# Patient Record
Sex: FEMALE | Race: WHITE | NOT HISPANIC OR LATINO | URBAN - METROPOLITAN AREA
[De-identification: names, ages, dates, MRNs, and addresses within clinical notes are randomized per-mention and may not be internally consistent; named-entity substitution may affect disease eponyms.]

---

## 2019-04-01 ENCOUNTER — OUTPATIENT (OUTPATIENT)
Dept: OUTPATIENT SERVICES | Facility: HOSPITAL | Age: 54
LOS: 1 days | Discharge: HOME | End: 2019-04-01
Payer: COMMERCIAL

## 2019-04-01 VITALS
RESPIRATION RATE: 16 BRPM | HEIGHT: 62 IN | SYSTOLIC BLOOD PRESSURE: 120 MMHG | WEIGHT: 113.32 LBS | TEMPERATURE: 98 F | HEART RATE: 51 BPM | DIASTOLIC BLOOD PRESSURE: 51 MMHG | OXYGEN SATURATION: 100 %

## 2019-04-01 DIAGNOSIS — Z90.13 ACQUIRED ABSENCE OF BILATERAL BREASTS AND NIPPLES: Chronic | ICD-10-CM

## 2019-04-01 DIAGNOSIS — Z12.11 ENCOUNTER FOR SCREENING FOR MALIGNANT NEOPLASM OF COLON: ICD-10-CM

## 2019-04-01 DIAGNOSIS — Z80.3 FAMILY HISTORY OF MALIGNANT NEOPLASM OF BREAST: ICD-10-CM

## 2019-04-01 DIAGNOSIS — Z85.3 PERSONAL HISTORY OF MALIGNANT NEOPLASM OF BREAST: ICD-10-CM

## 2019-04-01 DIAGNOSIS — Z90.13 ACQUIRED ABSENCE OF BILATERAL BREASTS AND NIPPLES: ICD-10-CM

## 2019-04-01 DIAGNOSIS — K63.5 POLYP OF COLON: ICD-10-CM

## 2019-04-01 DIAGNOSIS — Z87.891 PERSONAL HISTORY OF NICOTINE DEPENDENCE: ICD-10-CM

## 2019-04-01 LAB
ANION GAP SERPL CALC-SCNC: 3 MMOL/L — LOW (ref 5–17)
BASOPHILS # BLD AUTO: 0.04 K/UL — SIGNIFICANT CHANGE UP (ref 0–0.2)
BASOPHILS NFR BLD AUTO: 0.7 % — SIGNIFICANT CHANGE UP (ref 0–2)
BUN SERPL-MCNC: 17 MG/DL — SIGNIFICANT CHANGE UP (ref 7–23)
CALCIUM SERPL-MCNC: 9.2 MG/DL — SIGNIFICANT CHANGE UP (ref 8.5–10.1)
CHLORIDE SERPL-SCNC: 106 MMOL/L — SIGNIFICANT CHANGE UP (ref 96–108)
CO2 SERPL-SCNC: 31 MMOL/L — SIGNIFICANT CHANGE UP (ref 22–31)
CREAT SERPL-MCNC: 0.69 MG/DL — SIGNIFICANT CHANGE UP (ref 0.5–1.3)
EOSINOPHIL # BLD AUTO: 0.24 K/UL — SIGNIFICANT CHANGE UP (ref 0–0.5)
EOSINOPHIL NFR BLD AUTO: 4.4 % — SIGNIFICANT CHANGE UP (ref 0–6)
GLUCOSE SERPL-MCNC: 81 MG/DL — SIGNIFICANT CHANGE UP (ref 70–99)
HCT VFR BLD CALC: 39.6 % — SIGNIFICANT CHANGE UP (ref 34.5–45)
HGB BLD-MCNC: 13.2 G/DL — SIGNIFICANT CHANGE UP (ref 11.5–15.5)
IMM GRANULOCYTES NFR BLD AUTO: 0.2 % — SIGNIFICANT CHANGE UP (ref 0–1.5)
LYMPHOCYTES # BLD AUTO: 1.61 K/UL — SIGNIFICANT CHANGE UP (ref 1–3.3)
LYMPHOCYTES # BLD AUTO: 29.7 % — SIGNIFICANT CHANGE UP (ref 13–44)
MCHC RBC-ENTMCNC: 31.7 PG — SIGNIFICANT CHANGE UP (ref 27–34)
MCHC RBC-ENTMCNC: 33.3 GM/DL — SIGNIFICANT CHANGE UP (ref 32–36)
MCV RBC AUTO: 95.2 FL — SIGNIFICANT CHANGE UP (ref 80–100)
MONOCYTES # BLD AUTO: 0.48 K/UL — SIGNIFICANT CHANGE UP (ref 0–0.9)
MONOCYTES NFR BLD AUTO: 8.9 % — SIGNIFICANT CHANGE UP (ref 2–14)
NEUTROPHILS # BLD AUTO: 3.04 K/UL — SIGNIFICANT CHANGE UP (ref 1.8–7.4)
NEUTROPHILS NFR BLD AUTO: 56.1 % — SIGNIFICANT CHANGE UP (ref 43–77)
NRBC # BLD: 0 /100 WBCS — SIGNIFICANT CHANGE UP (ref 0–0)
PLATELET # BLD AUTO: 202 K/UL — SIGNIFICANT CHANGE UP (ref 150–400)
POTASSIUM SERPL-MCNC: 3.8 MMOL/L — SIGNIFICANT CHANGE UP (ref 3.5–5.3)
POTASSIUM SERPL-SCNC: 3.8 MMOL/L — SIGNIFICANT CHANGE UP (ref 3.5–5.3)
RBC # BLD: 4.16 M/UL — SIGNIFICANT CHANGE UP (ref 3.8–5.2)
RBC # FLD: 12.8 % — SIGNIFICANT CHANGE UP (ref 10.3–14.5)
SODIUM SERPL-SCNC: 140 MMOL/L — SIGNIFICANT CHANGE UP (ref 135–145)
WBC # BLD: 5.42 K/UL — SIGNIFICANT CHANGE UP (ref 3.8–10.5)
WBC # FLD AUTO: 5.42 K/UL — SIGNIFICANT CHANGE UP (ref 3.8–10.5)

## 2019-04-01 PROCEDURE — 93010 ELECTROCARDIOGRAM REPORT: CPT

## 2019-04-01 NOTE — H&P PST ADULT - HISTORY OF PRESENT ILLNESS
55 y/o female with pmhx of breast cancer s/p bilateral mastectomy presents to PST prior to colonoscopy. Reports abdominal bloating and excessive flatus. Now for proposed procedure.

## 2019-04-01 NOTE — H&P PST ADULT - ASSESSMENT
55 y/o female scheduled for colonoscopy on 04/09/19 with Dr Carmona      PLAN:  1. Labs per Dr Carmona  2. EKG  3. Discussed day of procedure instructions. Pt verbalized complete understanding of instructions.

## 2019-04-01 NOTE — H&P PST ADULT - NSANTHOSAYNRD_GEN_A_CORE
No. DALI screening performed.  STOP BANG Legend: 0-2 = LOW Risk; 3-4 = INTERMEDIATE Risk; 5-8 = HIGH Risk

## 2019-04-09 ENCOUNTER — RESULT REVIEW (OUTPATIENT)
Age: 54
End: 2019-04-09

## 2019-04-09 ENCOUNTER — OUTPATIENT (OUTPATIENT)
Dept: OUTPATIENT SERVICES | Facility: HOSPITAL | Age: 54
LOS: 1 days | Discharge: HOME | End: 2019-04-09
Payer: COMMERCIAL

## 2019-04-09 VITALS
HEIGHT: 64 IN | SYSTOLIC BLOOD PRESSURE: 131 MMHG | DIASTOLIC BLOOD PRESSURE: 72 MMHG | TEMPERATURE: 97 F | HEART RATE: 57 BPM | WEIGHT: 113.1 LBS | OXYGEN SATURATION: 100 % | RESPIRATION RATE: 15 BRPM

## 2019-04-09 DIAGNOSIS — Z90.13 ACQUIRED ABSENCE OF BILATERAL BREASTS AND NIPPLES: Chronic | ICD-10-CM

## 2019-04-09 PROCEDURE — 88305 TISSUE EXAM BY PATHOLOGIST: CPT | Mod: 26

## 2019-04-11 LAB — SURGICAL PATHOLOGY STUDY: SIGNIFICANT CHANGE UP

## 2019-04-12 DIAGNOSIS — Z01.818 ENCOUNTER FOR OTHER PREPROCEDURAL EXAMINATION: ICD-10-CM

## 2019-04-12 DIAGNOSIS — Z12.11 ENCOUNTER FOR SCREENING FOR MALIGNANT NEOPLASM OF COLON: ICD-10-CM

## 2019-04-14 DIAGNOSIS — Z90.13 ACQUIRED ABSENCE OF BILATERAL BREASTS AND NIPPLES: ICD-10-CM

## 2019-04-14 DIAGNOSIS — Z12.11 ENCOUNTER FOR SCREENING FOR MALIGNANT NEOPLASM OF COLON: ICD-10-CM

## 2019-04-14 DIAGNOSIS — Z87.891 PERSONAL HISTORY OF NICOTINE DEPENDENCE: ICD-10-CM

## 2019-04-14 DIAGNOSIS — Z80.3 FAMILY HISTORY OF MALIGNANT NEOPLASM OF BREAST: ICD-10-CM

## 2019-04-14 DIAGNOSIS — Z85.3 PERSONAL HISTORY OF MALIGNANT NEOPLASM OF BREAST: ICD-10-CM

## 2019-04-14 DIAGNOSIS — K63.5 POLYP OF COLON: ICD-10-CM

## 2019-05-13 ENCOUNTER — RESULT REVIEW (OUTPATIENT)
Age: 54
End: 2019-05-13

## 2021-01-04 NOTE — H&P PST ADULT - AS BP NONINV METHOD
Please fax today's OV note to [facility] to get wheelchair, and place order if they need one. See telephone enc 12/10/20    · Ynes returned call to clinic and informs me that she forgot [facility] is no longer in [place], would suggest using [facility] in [place].  According to [facility], patient's insurance would require a Face-to-Face and proper forms to be completed along with script.  Ok as requested?  If yes, [facility] can be reached at [phone] and fax [phone].   electronic

## 2021-05-12 NOTE — H&P PST ADULT - GENERAL
negative Burow's Advancement Flap Text: The defect edges were debeveled with a #15 scalpel blade.  Given the location of the defect and the proximity to free margins a Burow's advancement flap was deemed most appropriate.  Using a sterile surgical marker, the appropriate advancement flap was drawn incorporating the defect and placing the expected incisions within the relaxed skin tension lines where possible.    The area thus outlined was incised deep to adipose tissue with a #15 scalpel blade.  The skin margins were undermined to an appropriate distance in all directions utilizing iris scissors.

## 2021-06-01 ENCOUNTER — APPOINTMENT (OUTPATIENT)
Dept: OTOLARYNGOLOGY | Facility: CLINIC | Age: 56
End: 2021-06-01
Payer: COMMERCIAL

## 2021-06-01 VITALS
SYSTOLIC BLOOD PRESSURE: 113 MMHG | DIASTOLIC BLOOD PRESSURE: 77 MMHG | WEIGHT: 115 LBS | HEART RATE: 59 BPM | BODY MASS INDEX: 19.63 KG/M2 | TEMPERATURE: 96.8 F | HEIGHT: 64 IN

## 2021-06-01 DIAGNOSIS — J34.2 DEVIATED NASAL SEPTUM: ICD-10-CM

## 2021-06-01 DIAGNOSIS — J30.2 OTHER SEASONAL ALLERGIC RHINITIS: ICD-10-CM

## 2021-06-01 DIAGNOSIS — H81.11 BENIGN PAROXYSMAL VERTIGO, RIGHT EAR: ICD-10-CM

## 2021-06-01 DIAGNOSIS — H60.62 UNSPECIFIED CHRONIC OTITIS EXTERNA, LEFT EAR: ICD-10-CM

## 2021-06-01 DIAGNOSIS — R42 DIZZINESS AND GIDDINESS: ICD-10-CM

## 2021-06-01 DIAGNOSIS — H93.299 OTHER ABNORMAL AUDITORY PERCEPTIONS, UNSPECIFIED EAR: ICD-10-CM

## 2021-06-01 PROBLEM — Z00.00 ENCOUNTER FOR PREVENTIVE HEALTH EXAMINATION: Status: ACTIVE | Noted: 2021-06-01

## 2021-06-01 PROBLEM — C50.919 MALIGNANT NEOPLASM OF UNSPECIFIED SITE OF UNSPECIFIED FEMALE BREAST: Chronic | Status: ACTIVE | Noted: 2019-04-01

## 2021-06-01 PROCEDURE — 92557 COMPREHENSIVE HEARING TEST: CPT

## 2021-06-01 PROCEDURE — 92570 ACOUSTIC IMMITANCE TESTING: CPT

## 2021-06-01 PROCEDURE — 99214 OFFICE O/P EST MOD 30 MIN: CPT

## 2021-06-01 PROCEDURE — 99072 ADDL SUPL MATRL&STAF TM PHE: CPT

## 2021-06-01 RX ORDER — MECLIZINE HYDROCHLORIDE 25 MG/1
25 TABLET ORAL
Qty: 90 | Refills: 1 | Status: ACTIVE | COMMUNITY
Start: 2021-06-01 | End: 1900-01-01

## 2021-06-01 RX ORDER — MOMETASONE FUROATE 1 MG/G
0.1 CREAM TOPICAL TWICE DAILY
Qty: 1 | Refills: 1 | Status: ACTIVE | COMMUNITY
Start: 2021-06-01 | End: 1900-01-01

## 2021-06-01 NOTE — REVIEW OF SYSTEMS
[Dizziness] : dizziness [Vertigo] : vertigo [Nasal Congestion] : nasal congestion [Negative] : Heme/Lymph [As Noted in HPI] : as noted in HPI [de-identified] : headaches

## 2021-06-01 NOTE — HISTORY OF PRESENT ILLNESS
[de-identified] : 56 yr old female w vertigo worse when she rolls over to the left.\par -tinnitus, otalgia\par +hx vertigo once/year since 2019\par -hx otitis, noise exp, head trauma\par +FH father presby

## 2021-06-01 NOTE — ASSESSMENT
[FreeTextEntry1] :   WNL w type A AU\par \par rx elocon\par Flonase\par \par Epley performed for right BPPV\par f/u prn failure to improve

## 2021-06-01 NOTE — PHYSICAL EXAM
[Midline] : trachea located in midline position [Normal] : no nystagmus [] : [place]-Hallpike test is positive [de-identified] : dry skin left brittni [de-identified] : enlarged [de-identified] : head hanging right

## 2021-12-13 ENCOUNTER — RESULT REVIEW (OUTPATIENT)
Age: 56
End: 2021-12-13